# Patient Record
Sex: MALE | Race: WHITE | NOT HISPANIC OR LATINO | Employment: FULL TIME | URBAN - METROPOLITAN AREA
[De-identification: names, ages, dates, MRNs, and addresses within clinical notes are randomized per-mention and may not be internally consistent; named-entity substitution may affect disease eponyms.]

---

## 2023-02-15 ENCOUNTER — HOSPITAL ENCOUNTER (OUTPATIENT)
Dept: CARDIOLOGY | Facility: HOSPITAL | Age: 38
Discharge: HOME | End: 2023-02-15
Admitting: FAMILY MEDICINE
Payer: COMMERCIAL

## 2023-02-15 ENCOUNTER — OFFICE VISIT (OUTPATIENT)
Dept: INTERNAL MEDICINE | Facility: CLINIC | Age: 38
End: 2023-02-15
Payer: COMMERCIAL

## 2023-02-15 VITALS
TEMPERATURE: 97 F | RESPIRATION RATE: 16 BRPM | WEIGHT: 315 LBS | DIASTOLIC BLOOD PRESSURE: 70 MMHG | SYSTOLIC BLOOD PRESSURE: 122 MMHG | HEART RATE: 92 BPM | BODY MASS INDEX: 44.1 KG/M2 | HEIGHT: 71 IN | OXYGEN SATURATION: 97 %

## 2023-02-15 DIAGNOSIS — Z00.00 HEALTHCARE MAINTENANCE: ICD-10-CM

## 2023-02-15 DIAGNOSIS — R60.0 BILATERAL EDEMA OF LOWER EXTREMITY: ICD-10-CM

## 2023-02-15 DIAGNOSIS — Z00.00 VISIT FOR WELL MAN HEALTH CHECK: Primary | ICD-10-CM

## 2023-02-15 LAB

## 2023-02-15 PROCEDURE — 93970 EXTREMITY STUDY: CPT

## 2023-02-15 PROCEDURE — 99395 PREV VISIT EST AGE 18-39: CPT | Performed by: FAMILY MEDICINE

## 2023-02-15 PROCEDURE — 99213 OFFICE O/P EST LOW 20 MIN: CPT | Performed by: FAMILY MEDICINE

## 2023-02-15 NOTE — PROGRESS NOTES
Patient sent for a bilateral lower extremity venous duplex. Scan completed and preliminary report of negative for DVT/SVT bilaterally given to Dr. Herbie Chamorro. Patient advised and is free to leave.

## 2023-02-15 NOTE — PROGRESS NOTES
Subjective   Zachariah Garcia is a 37 y.o. male and is here for a comprehensive physical exam. The patient reports no problems.            Social History:   Social History     Socioeconomic History   • Marital status: Single   Tobacco Use   • Smoking status: Never   Substance and Sexual Activity   • Alcohol use: No       Family History: No family history on file.    Past Medical History:   Past Medical History:   Diagnosis Date   • Seasonal allergies        The following portions of the patient's history were reviewed and updated as appropriate: allergies, current medications, past family history, past medical history, past social history, past surgical history and problem list.    Review of Systems    Review of Systems   Constitutional: Negative for chills and fever.   HENT: Negative for congestion, rhinorrhea, sinus pain and sore throat.    Eyes: Negative for photophobia and visual disturbance.   Respiratory: Negative for cough, chest tightness and shortness of breath.    Cardiovascular: Negative for chest pain and palpitations.   Gastrointestinal: Negative for diarrhea, nausea and vomiting.   Genitourinary: Negative for dysuria, frequency and urgency.   Skin: Negative for rash and wound.   Neurological: Negative for dizziness and syncope.   Psychiatric/Behavioral: Negative for behavioral problems and confusion.       Objective   Physical Exam  Vitals and nursing note reviewed.   Constitutional:       Appearance: He is well-developed.   HENT:      Head: Normocephalic and atraumatic.      Right Ear: External ear normal.      Left Ear: External ear normal.   Cardiovascular:      Rate and Rhythm: Normal rate and regular rhythm.      Heart sounds: Normal heart sounds.   Pulmonary:      Effort: Pulmonary effort is normal. No respiratory distress.      Breath sounds: Normal breath sounds.   Abdominal:      Palpations: Abdomen is soft.      Tenderness: There is no abdominal tenderness. There is no guarding.    Musculoskeletal:         General: Normal range of motion.      Cervical back: Normal range of motion and neck supple.   Lymphadenopathy:      Cervical: No cervical adenopathy.   Skin:     General: Skin is warm.   Neurological:      Mental Status: He is alert and oriented to person, place, and time.   Psychiatric:         Behavior: Behavior normal.         Vitals:    02/15/23 0848   BP: 122/70   Pulse: 92   Resp: 16   Temp: 97 °F (36.1 °C)   SpO2: 97%     Body mass index is 47.76 kg/m².    Medications:   Current Outpatient Medications:   •  cetirizine (zyrTEC) 10 MG tablet, Take 10 mg by mouth Daily., Disp: , Rfl:   •  cyclobenzaprine (FLEXERIL) 10 MG tablet, Take 1 tablet by mouth 3 (Three) Times a Day As Needed for Muscle Spasms., Disp: 15 tablet, Rfl: 0  •  montelukast (SINGULAIR) 10 MG tablet, Take 10 mg by mouth every night at bedtime., Disp: , Rfl: 11  •  PROAIR  (90 Base) MCG/ACT inhaler, INHALE 2 PUFFS BY MOUTH EVERY FOUR TO SIX HOURS AS NEEDED cough, wheezing or shortness of breath, Disp: , Rfl: 3       Assessment & Plan   Healthy male exam.      1. Healthcare Maintenance:  2. Patient Counseling:  --Nutrition: Stressed importance of moderation in sodium/caffeine intake, saturated fat and cholesterol, caloric balance, sufficient intake of fresh fruits, vegetables, fiber, calcium and vit D  --Exercise: Recommended 30 minutes of exercise daily.   --Immunizations reviewed.      Diagnoses and all orders for this visit:    Visit for well [facility] check    Healthcare maintenance  -     CBC & Differential  -     Comprehensive Metabolic Panel  -     Hemoglobin A1c  -     Thyroid Panel With TSH  -     Lipid Panel With LDL / HDL Ratio  -     Hepatitis C Antibody            No follow-ups on file.           Dictated utilizing Dragon Voice Recognition Software

## 2023-02-15 NOTE — PROGRESS NOTES
"Chief Complaint  No chief complaint on file.    Subjective        Zachariah Garcia presents to [facility]  History of Present Illness    Patient presents at today's office visit for concern about having lower extremity swellings.  He states that he was seen at the urgent care about a week ago, and he had bilateral lower leg extremity swelling.  Patient states that his blood pressure was elevated.  Since then he has been trying to avoid salt, which is also help with the blood pressure come back to normal today.  And leg swelling has improved.    Patient did note that he spent 3 days walking around at the [place], and then drove 12 hours back.  And then he started experiencing the lower leg swellings.  I discussed with him at today's office visit that we can try to get a Doppler to rule out a DVT.    Objective   Vital Signs:  /70   Pulse 92   Temp 97 °F (36.1 °C)   Resp 16   Ht 180.3 cm (71\")   Wt (!) 155 kg (342 lb 6.4 oz)   SpO2 97%   BMI 47.76 kg/m²   Estimated body mass index is 47.76 kg/m² as calculated from the following:    Height as of this encounter: 180.3 cm (71\").    Weight as of this encounter: 155 kg (342 lb 6.4 oz).             Physical Exam  Vitals and nursing note reviewed.   Constitutional:       Appearance: He is well-developed.   HENT:      Head: Normocephalic and atraumatic.   Musculoskeletal:      Cervical back: Normal range of motion and neck supple.   Neurological:      Mental Status: He is alert and oriented to person, place, and time.   Psychiatric:         Behavior: Behavior normal.        Result Review :                   Assessment and Plan   Diagnoses and all orders for this visit:        Healthcare maintenance  -     CBC & Differential  -     Comprehensive Metabolic Panel  -     Hemoglobin A1c  -     Thyroid Panel With TSH  -     Lipid Panel With LDL / HDL Ratio  -     Hepatitis C Antibody    Bilateral edema of lower extremity  -     Duplex " Venous Lower Extremity - Bilateral CAR    He does not have other risk factors for DVTs, except the long car ride that he had coming back from [place].  I discussed with him at today's visit that we can get a Doppler.  I suspect it is most likely going to be negative, and that the swelling could be related due to his salt intake.  But with his blood pressure being normal at today's office visit I am less likely to start him on any medications.  I would like him to try to monitor his diet, and reevaluate him in few weeks.         Follow Up   No follow-ups on file.  Patient was given instructions and counseling regarding his condition or for health maintenance advice. Please see specific information pulled into the AVS if appropriate.

## 2023-02-16 DIAGNOSIS — R74.01 ELEVATED ALT MEASUREMENT: Primary | ICD-10-CM

## 2023-02-16 LAB
ALBUMIN SERPL-MCNC: 4.4 G/DL (ref 3.5–5.2)
ALBUMIN/GLOB SERPL: 2 G/DL
ALP SERPL-CCNC: 104 U/L (ref 39–117)
ALT SERPL-CCNC: 63 U/L (ref 1–41)
AST SERPL-CCNC: 29 U/L (ref 1–40)
BASOPHILS # BLD AUTO: 0.06 10*3/MM3 (ref 0–0.2)
BASOPHILS NFR BLD AUTO: 1.1 % (ref 0–1.5)
BILIRUB SERPL-MCNC: 0.4 MG/DL (ref 0–1.2)
BUN SERPL-MCNC: 14 MG/DL (ref 6–20)
BUN/CREAT SERPL: 14 (ref 7–25)
CALCIUM SERPL-MCNC: 9.1 MG/DL (ref 8.6–10.5)
CHLORIDE SERPL-SCNC: 105 MMOL/L (ref 98–107)
CHOLEST SERPL-MCNC: 181 MG/DL (ref 0–200)
CO2 SERPL-SCNC: 28.9 MMOL/L (ref 22–29)
CREAT SERPL-MCNC: 1 MG/DL (ref 0.76–1.27)
EGFRCR SERPLBLD CKD-EPI 2021: 100.7 ML/MIN/1.73
EOSINOPHIL # BLD AUTO: 0.31 10*3/MM3 (ref 0–0.4)
EOSINOPHIL NFR BLD AUTO: 5.7 % (ref 0.3–6.2)
ERYTHROCYTE [DISTWIDTH] IN BLOOD BY AUTOMATED COUNT: 12.5 % (ref 12.3–15.4)
FT4I SERPL CALC-MCNC: 2.4 (ref 1.2–4.9)
GLOBULIN SER CALC-MCNC: 2.2 GM/DL
GLUCOSE SERPL-MCNC: 103 MG/DL (ref 65–99)
HBA1C MFR BLD: 5.4 % (ref 4.8–5.6)
HCT VFR BLD AUTO: 49.7 % (ref 37.5–51)
HCV IGG SERPL QL IA: NON REACTIVE
HDLC SERPL-MCNC: 38 MG/DL (ref 40–60)
HGB BLD-MCNC: 16.5 G/DL (ref 13–17.7)
IMM GRANULOCYTES # BLD AUTO: 0.02 10*3/MM3 (ref 0–0.05)
IMM GRANULOCYTES NFR BLD AUTO: 0.4 % (ref 0–0.5)
LDLC SERPL CALC-MCNC: 127 MG/DL (ref 0–100)
LDLC/HDLC SERPL: 3.31 {RATIO}
LYMPHOCYTES # BLD AUTO: 2.09 10*3/MM3 (ref 0.7–3.1)
LYMPHOCYTES NFR BLD AUTO: 38.6 % (ref 19.6–45.3)
MCH RBC QN AUTO: 29.5 PG (ref 26.6–33)
MCHC RBC AUTO-ENTMCNC: 33.2 G/DL (ref 31.5–35.7)
MCV RBC AUTO: 88.9 FL (ref 79–97)
MONOCYTES # BLD AUTO: 0.43 10*3/MM3 (ref 0.1–0.9)
MONOCYTES NFR BLD AUTO: 7.9 % (ref 5–12)
NEUTROPHILS # BLD AUTO: 2.51 10*3/MM3 (ref 1.7–7)
NEUTROPHILS NFR BLD AUTO: 46.3 % (ref 42.7–76)
NRBC BLD AUTO-RTO: 0 /100 WBC (ref 0–0.2)
PLATELET # BLD AUTO: 306 10*3/MM3 (ref 140–450)
POTASSIUM SERPL-SCNC: 5 MMOL/L (ref 3.5–5.2)
PROT SERPL-MCNC: 6.6 G/DL (ref 6–8.5)
RBC # BLD AUTO: 5.59 10*6/MM3 (ref 4.14–5.8)
SODIUM SERPL-SCNC: 143 MMOL/L (ref 136–145)
T3RU NFR SERPL: 26 % (ref 24–39)
T4 SERPL-MCNC: 9.2 UG/DL (ref 4.5–12)
TRIGL SERPL-MCNC: 86 MG/DL (ref 0–150)
TSH SERPL DL<=0.005 MIU/L-ACNC: 2.53 UIU/ML (ref 0.45–4.5)
VLDLC SERPL CALC-MCNC: 16 MG/DL (ref 5–40)
WBC # BLD AUTO: 5.42 10*3/MM3 (ref 3.4–10.8)

## 2023-02-16 NOTE — PROGRESS NOTES
Please inform the patient of the following abnormal results. Liver enzyme, ALT elevated. Will like him to repeat LFT in 1 mos. He just needs to get the labs done in one month, which orders are in.

## 2023-02-28 ENCOUNTER — OFFICE VISIT (OUTPATIENT)
Dept: INTERNAL MEDICINE | Facility: CLINIC | Age: 38
End: 2023-02-28
Payer: COMMERCIAL

## 2023-02-28 VITALS
SYSTOLIC BLOOD PRESSURE: 130 MMHG | DIASTOLIC BLOOD PRESSURE: 82 MMHG | TEMPERATURE: 97.1 F | RESPIRATION RATE: 16 BRPM | WEIGHT: 315 LBS | BODY MASS INDEX: 44.1 KG/M2 | HEART RATE: 69 BPM | HEIGHT: 71 IN | OXYGEN SATURATION: 96 %

## 2023-02-28 DIAGNOSIS — R74.01 ELEVATED ALT MEASUREMENT: Primary | ICD-10-CM

## 2023-02-28 PROCEDURE — 99213 OFFICE O/P EST LOW 20 MIN: CPT | Performed by: FAMILY MEDICINE
